# Patient Record
Sex: MALE | Race: NATIVE HAWAIIAN OR OTHER PACIFIC ISLANDER | HISPANIC OR LATINO | URBAN - METROPOLITAN AREA
[De-identification: names, ages, dates, MRNs, and addresses within clinical notes are randomized per-mention and may not be internally consistent; named-entity substitution may affect disease eponyms.]

---

## 2022-06-03 ENCOUNTER — INPATIENT (INPATIENT)
Facility: HOSPITAL | Age: 57
LOS: 1 days | Discharge: HOME | DRG: 310 | End: 2022-06-05
Attending: FAMILY MEDICINE | Admitting: FAMILY MEDICINE
Payer: COMMERCIAL

## 2022-06-03 VITALS — HEIGHT: 69 IN | WEIGHT: 259.04 LBS

## 2022-06-03 DIAGNOSIS — I48.91 UNSPECIFIED ATRIAL FIBRILLATION: ICD-10-CM

## 2022-06-03 DIAGNOSIS — R09.89 OTHER SPECIFIED SYMPTOMS AND SIGNS INVOLVING THE CIRCULATORY AND RESPIRATORY SYSTEMS: ICD-10-CM

## 2022-06-03 LAB
ADD ON TEST-SPECIMEN IN LAB: SIGNIFICANT CHANGE UP
ALBUMIN SERPL ELPH-MCNC: 4.4 G/DL — SIGNIFICANT CHANGE UP (ref 3.3–5)
ALP SERPL-CCNC: 68 U/L — SIGNIFICANT CHANGE UP (ref 40–120)
ALT FLD-CCNC: 84 U/L — HIGH (ref 12–78)
ANION GAP SERPL CALC-SCNC: 12 MMOL/L — SIGNIFICANT CHANGE UP (ref 5–17)
APTT BLD: 30 SEC — SIGNIFICANT CHANGE UP (ref 27.5–35.5)
AST SERPL-CCNC: 40 U/L — HIGH (ref 15–37)
BASOPHILS # BLD AUTO: 0.05 K/UL — SIGNIFICANT CHANGE UP (ref 0–0.2)
BASOPHILS NFR BLD AUTO: 0.4 % — SIGNIFICANT CHANGE UP (ref 0–2)
BILIRUB SERPL-MCNC: 1 MG/DL — SIGNIFICANT CHANGE UP (ref 0.2–1.2)
BUN SERPL-MCNC: 18 MG/DL — SIGNIFICANT CHANGE UP (ref 7–23)
CALCIUM SERPL-MCNC: 9.1 MG/DL — SIGNIFICANT CHANGE UP (ref 8.5–10.1)
CHLORIDE SERPL-SCNC: 95 MMOL/L — LOW (ref 96–108)
CO2 SERPL-SCNC: 24 MMOL/L — SIGNIFICANT CHANGE UP (ref 22–31)
CREAT SERPL-MCNC: 1.17 MG/DL — SIGNIFICANT CHANGE UP (ref 0.5–1.3)
EGFR: 74 ML/MIN/1.73M2 — SIGNIFICANT CHANGE UP
EOSINOPHIL # BLD AUTO: 0.06 K/UL — SIGNIFICANT CHANGE UP (ref 0–0.5)
EOSINOPHIL NFR BLD AUTO: 0.5 % — SIGNIFICANT CHANGE UP (ref 0–6)
GLUCOSE SERPL-MCNC: 269 MG/DL — HIGH (ref 70–99)
HCT VFR BLD CALC: 49.7 % — SIGNIFICANT CHANGE UP (ref 39–50)
HGB BLD-MCNC: 17.2 G/DL — HIGH (ref 13–17)
IMM GRANULOCYTES NFR BLD AUTO: 0.4 % — SIGNIFICANT CHANGE UP (ref 0–1.5)
INR BLD: 1.09 RATIO — SIGNIFICANT CHANGE UP (ref 0.88–1.16)
LYMPHOCYTES # BLD AUTO: 3.93 K/UL — HIGH (ref 1–3.3)
LYMPHOCYTES # BLD AUTO: 30.1 % — SIGNIFICANT CHANGE UP (ref 13–44)
MCHC RBC-ENTMCNC: 27.8 PG — SIGNIFICANT CHANGE UP (ref 27–34)
MCHC RBC-ENTMCNC: 34.6 GM/DL — SIGNIFICANT CHANGE UP (ref 32–36)
MCV RBC AUTO: 80.4 FL — SIGNIFICANT CHANGE UP (ref 80–100)
MONOCYTES # BLD AUTO: 0.87 K/UL — SIGNIFICANT CHANGE UP (ref 0–0.9)
MONOCYTES NFR BLD AUTO: 6.7 % — SIGNIFICANT CHANGE UP (ref 2–14)
NEUTROPHILS # BLD AUTO: 8.08 K/UL — HIGH (ref 1.8–7.4)
NEUTROPHILS NFR BLD AUTO: 61.9 % — SIGNIFICANT CHANGE UP (ref 43–77)
NT-PROBNP SERPL-SCNC: 251 PG/ML — HIGH (ref 0–125)
PLATELET # BLD AUTO: 232 K/UL — SIGNIFICANT CHANGE UP (ref 150–400)
POTASSIUM SERPL-MCNC: 3.3 MMOL/L — LOW (ref 3.5–5.3)
POTASSIUM SERPL-SCNC: 3.3 MMOL/L — LOW (ref 3.5–5.3)
PROT SERPL-MCNC: 8.3 GM/DL — SIGNIFICANT CHANGE UP (ref 6–8.3)
PROTHROM AB SERPL-ACNC: 12.6 SEC — SIGNIFICANT CHANGE UP (ref 10.5–13.4)
RBC # BLD: 6.18 M/UL — HIGH (ref 4.2–5.8)
RBC # FLD: 12.7 % — SIGNIFICANT CHANGE UP (ref 10.3–14.5)
SARS-COV-2 RNA SPEC QL NAA+PROBE: SIGNIFICANT CHANGE UP
SODIUM SERPL-SCNC: 131 MMOL/L — LOW (ref 135–145)
TROPONIN I, HIGH SENSITIVITY RESULT: 9.13 NG/L — SIGNIFICANT CHANGE UP
WBC # BLD: 13.04 K/UL — HIGH (ref 3.8–10.5)
WBC # FLD AUTO: 13.04 K/UL — HIGH (ref 3.8–10.5)

## 2022-06-03 PROCEDURE — 93005 ELECTROCARDIOGRAM TRACING: CPT

## 2022-06-03 PROCEDURE — 83735 ASSAY OF MAGNESIUM: CPT

## 2022-06-03 PROCEDURE — 82962 GLUCOSE BLOOD TEST: CPT

## 2022-06-03 PROCEDURE — 84443 ASSAY THYROID STIM HORMONE: CPT

## 2022-06-03 PROCEDURE — 99285 EMERGENCY DEPT VISIT HI MDM: CPT

## 2022-06-03 PROCEDURE — 71045 X-RAY EXAM CHEST 1 VIEW: CPT | Mod: 26

## 2022-06-03 PROCEDURE — 80076 HEPATIC FUNCTION PANEL: CPT

## 2022-06-03 PROCEDURE — 83036 HEMOGLOBIN GLYCOSYLATED A1C: CPT

## 2022-06-03 PROCEDURE — 85027 COMPLETE CBC AUTOMATED: CPT

## 2022-06-03 PROCEDURE — 93306 TTE W/DOPPLER COMPLETE: CPT

## 2022-06-03 PROCEDURE — 84100 ASSAY OF PHOSPHORUS: CPT

## 2022-06-03 PROCEDURE — 84484 ASSAY OF TROPONIN QUANT: CPT

## 2022-06-03 PROCEDURE — 93010 ELECTROCARDIOGRAM REPORT: CPT

## 2022-06-03 PROCEDURE — 36415 COLL VENOUS BLD VENIPUNCTURE: CPT

## 2022-06-03 PROCEDURE — 99223 1ST HOSP IP/OBS HIGH 75: CPT

## 2022-06-03 PROCEDURE — 80048 BASIC METABOLIC PNL TOTAL CA: CPT

## 2022-06-03 PROCEDURE — 80061 LIPID PANEL: CPT

## 2022-06-03 PROCEDURE — G0378: CPT

## 2022-06-03 RX ORDER — LISINOPRIL 2.5 MG/1
5 TABLET ORAL DAILY
Refills: 0 | Status: DISCONTINUED | OUTPATIENT
Start: 2022-06-03 | End: 2022-06-05

## 2022-06-03 RX ORDER — INSULIN LISPRO 100/ML
VIAL (ML) SUBCUTANEOUS
Refills: 0 | Status: DISCONTINUED | OUTPATIENT
Start: 2022-06-03 | End: 2022-06-05

## 2022-06-03 RX ORDER — DILTIAZEM HCL 120 MG
20 CAPSULE, EXT RELEASE 24 HR ORAL ONCE
Refills: 0 | Status: DISCONTINUED | OUTPATIENT
Start: 2022-06-03 | End: 2022-06-03

## 2022-06-03 RX ORDER — POTASSIUM CHLORIDE 20 MEQ
40 PACKET (EA) ORAL ONCE
Refills: 0 | Status: COMPLETED | OUTPATIENT
Start: 2022-06-03 | End: 2022-06-03

## 2022-06-03 RX ORDER — DEXTROSE 50 % IN WATER 50 %
25 SYRINGE (ML) INTRAVENOUS ONCE
Refills: 0 | Status: DISCONTINUED | OUTPATIENT
Start: 2022-06-03 | End: 2022-06-05

## 2022-06-03 RX ORDER — ACETAMINOPHEN 500 MG
650 TABLET ORAL EVERY 6 HOURS
Refills: 0 | Status: DISCONTINUED | OUTPATIENT
Start: 2022-06-03 | End: 2022-06-05

## 2022-06-03 RX ORDER — GLUCAGON INJECTION, SOLUTION 0.5 MG/.1ML
1 INJECTION, SOLUTION SUBCUTANEOUS ONCE
Refills: 0 | Status: DISCONTINUED | OUTPATIENT
Start: 2022-06-03 | End: 2022-06-05

## 2022-06-03 RX ORDER — ENOXAPARIN SODIUM 100 MG/ML
120 INJECTION SUBCUTANEOUS ONCE
Refills: 0 | Status: COMPLETED | OUTPATIENT
Start: 2022-06-03 | End: 2022-06-04

## 2022-06-03 RX ORDER — ALPRAZOLAM 0.25 MG
0.25 TABLET ORAL AT BEDTIME
Refills: 0 | Status: DISCONTINUED | OUTPATIENT
Start: 2022-06-03 | End: 2022-06-05

## 2022-06-03 RX ORDER — INSULIN LISPRO 100/ML
VIAL (ML) SUBCUTANEOUS AT BEDTIME
Refills: 0 | Status: DISCONTINUED | OUTPATIENT
Start: 2022-06-03 | End: 2022-06-05

## 2022-06-03 RX ORDER — SODIUM CHLORIDE 9 MG/ML
1000 INJECTION, SOLUTION INTRAVENOUS
Refills: 0 | Status: DISCONTINUED | OUTPATIENT
Start: 2022-06-03 | End: 2022-06-05

## 2022-06-03 RX ORDER — ASPIRIN/CALCIUM CARB/MAGNESIUM 324 MG
81 TABLET ORAL DAILY
Refills: 0 | Status: DISCONTINUED | OUTPATIENT
Start: 2022-06-03 | End: 2022-06-05

## 2022-06-03 RX ORDER — DEXTROSE 50 % IN WATER 50 %
15 SYRINGE (ML) INTRAVENOUS ONCE
Refills: 0 | Status: DISCONTINUED | OUTPATIENT
Start: 2022-06-03 | End: 2022-06-05

## 2022-06-03 RX ORDER — DILTIAZEM HCL 120 MG
30 CAPSULE, EXT RELEASE 24 HR ORAL EVERY 6 HOURS
Refills: 0 | Status: DISCONTINUED | OUTPATIENT
Start: 2022-06-03 | End: 2022-06-05

## 2022-06-03 RX ORDER — DEXTROSE 50 % IN WATER 50 %
12.5 SYRINGE (ML) INTRAVENOUS ONCE
Refills: 0 | Status: DISCONTINUED | OUTPATIENT
Start: 2022-06-03 | End: 2022-06-05

## 2022-06-03 RX ORDER — DILTIAZEM HCL 120 MG
20 CAPSULE, EXT RELEASE 24 HR ORAL ONCE
Refills: 0 | Status: COMPLETED | OUTPATIENT
Start: 2022-06-03 | End: 2022-06-03

## 2022-06-03 RX ORDER — DILTIAZEM HCL 120 MG
60 CAPSULE, EXT RELEASE 24 HR ORAL ONCE
Refills: 0 | Status: COMPLETED | OUTPATIENT
Start: 2022-06-03 | End: 2022-06-03

## 2022-06-03 RX ADMIN — Medication 2: at 22:32

## 2022-06-03 RX ADMIN — Medication 60 MILLIGRAM(S): at 19:37

## 2022-06-03 RX ADMIN — Medication 0.25 MILLIGRAM(S): at 23:49

## 2022-06-03 RX ADMIN — Medication 40 MILLIEQUIVALENT(S): at 20:13

## 2022-06-03 RX ADMIN — LISINOPRIL 5 MILLIGRAM(S): 2.5 TABLET ORAL at 23:49

## 2022-06-03 RX ADMIN — Medication 20 MILLIGRAM(S): at 18:48

## 2022-06-03 NOTE — ED PROVIDER NOTE - OBJECTIVE STATEMENT
57 y/o male with a PMHx of HTN on lisinopril presents to the ED c/o SOB. Pt states he has been travelling recently after closing a house in [place]. Reports SOB is worsened by bending over and is having episodes of feeling cold, clammy, diaphoretic, and nausea. Friend at bedside states the pt has been breaking out into a cold sweat x3 days. Pt states he went to urgent care and was prompted to the ED for hypertension. Pt endorses having a cocktail last night. Pt is currently Cardio: Dr. Antonio.

## 2022-06-03 NOTE — ED PROVIDER NOTE - CLINICAL SUMMARY MEDICAL DECISION MAKING FREE TEXT BOX
pt w/ lightheadedness found to be in afib w/ RVR ( new onset ), will rate control, cards consult, admit.

## 2022-06-03 NOTE — H&P ADULT - HISTORY OF PRESENT ILLNESS
56 year old male w hx DM, HTN on lisinopril came to ED c/o SOB     56 year old male w hx DM, HTN on lisinopril came to ED c/o SOB    Pt states recently returned to [place] from [place] 2 days ago afer selling home  Had not felt well ther  +SOB especially when bending over  +diaphoretic + nausea  denied chest pain   reported that pt had been diaphoretic x 3 days intermittently    Had been on metformin but was taken off 1 year ago  Last stress test 1 yr ago normal      In ED /97     RR 20    T 98.7    98%   AFIB w RVR  190  diltiazem 20 mg x 2  diltiazem 60 mg po  converted from afib to sinus tach   telemetry now AF  Kcl 40 meq po    PAST MEDICAL HX  DM II diabetes mellitus  HTN hypertension    PAST SURGICAL HX  Colonoscopy  w polyp : benign      FAMILY HX  +Heart disease : Father  MI age 78, Mother  + DM : Mother, Maternal Grandmother, Paternal Grandmother      SOCIAL HX    nonsmoker  social alcohol on weekend      COVID IMMUNIZATION  J&J x 1, Mod x 1, Pfizer x1 56 year old male w hx DM, HTN on lisinopril came to ED c/o SOB    Pt states recently returned to [place] from [place] 2 days ago afer selling home  Had not felt well there  +SOB especially when bending over  +diaphoretic + nausea  denied chest pain   reported that pt had been diaphoretic x 3 days intermittently    Significant stresses recently    Had been on metformin but was taken off 1 year ago  Last stress test 1 yr ago normal      In ED /97     RR 20    T 98.7    98%   AFIB w RVR  190  diltiazem 20 mg x 2  diltiazem 60 mg po  converted from afib to sinus tach   telemetry now AF  KCl 40 meq po    PAST MEDICAL HX  DM II diabetes mellitus  HTN hypertension    PAST SURGICAL HX  Colonoscopy  w polyp : benign      FAMILY HX  +Heart disease : Father  MI age 78, Mother  + DM : Mother, Maternal Grandmother, Paternal Grandmother      SOCIAL HX    nonsmoker  social alcohol on weekend      COVID IMMUNIZATION  J&J x 1, Mod x 1, Pfizer x1

## 2022-06-03 NOTE — H&P ADULT - ASSESSMENT
#New AFIB w RVR  CHADS - VASC 2  1. admit to telemetry  2. serial trop  3. diltiazem 40 mg IV and 60 mg po in ED  4. diltiazem 30 mg q 6 hrs  5. ECHO  6. Cardiology  7. TSH      #Hypokalemia  1. KCl 40 meq in ED  2. BMP in AM  3. Mg      #DM II w hyperglycemia  1. diet  2. BGM  3. ISS  4. Hb A1c  5. will likely need to restart metformin at time of discharge             #New AFIB w RVR  CHA2DS 2- VASC  2  DM + HTN (+obesity)  1. admit to telemetry  2. serial trop  3. diltiazem 40 mg IV and 60 mg po in ED  4. diltiazem 30 mg q 6 hrs  5. ECHO  6. Cardiology  7. TSH  8. will treat w lovenox 120 mg x 1 dose  9. after echo can decide OAC      #Hypokalemia  1. KCl 40 meq in ED  2. BMP in AM  3. Mg      #DM II w hyperglycemia  1. diet  2. BGM  3. ISS  4. Hb A1c  5. will likely need to restart metformin at time of discharge      #Elevated LFT  1. trend      #Apnea at night observed by    1. O2 overnight  2. to discuss sleep study w PCP      ##HTN  1. will decrease dose of lisinopril while on diltiazem  2. diltiazem as above        #VTE  lovenox x 1 dose  to decide on AC after ECHO

## 2022-06-03 NOTE — H&P ADULT - NSHPLABSRESULTS_GEN_ALL_CORE
EKG AFIB 167 wavering baseline   No dynamic ST-T changes (my reading)      CXR nl heart size no PVC by my reading

## 2022-06-03 NOTE — ED ADULT NURSE REASSESSMENT NOTE - NS ED NURSE REASSESS COMMENT FT1
received report from CARLOS Nieves. pt. resting comfortably with partner at bedside. pt converted from afib to sinus tach on the cardiac monitor. repeat EKG in progress.

## 2022-06-03 NOTE — ED PROVIDER NOTE - PHYSICAL EXAMINATION
No GEN - NAD; well appearing; A+O x3   HEAD - NC/AT     EYES - EOMI, no conjunctival pallor, no scleral icterus  ENT -   mucous membranes  moist , no discharge      NECK - Neck supple  PULM - CTA b/l,  symmetric breath sounds  COR -  irregular, rapid   ABD - , ND, NT, soft, no guarding, no rebound, no masses    BACK - no CVA tenderness, nontender spine     EXTREMS - no edema, no deformity, warm and well perfused    SKIN - diaphoretic   NEUROLOGIC - alert, sensation nl, motor 5/5 RUE/LUE/RLE/LLE

## 2022-06-03 NOTE — ED ADULT NURSE NOTE - OBJECTIVE STATEMENT
Pt presents to ED from home for rapid HR. Pt reports feeling SOB for a few days and just felt uncomfortable. No cardiac hx. No nausea/vo,iting. Denies chest, shoulder,back pain

## 2022-06-03 NOTE — H&P ADULT - NSHPPHYSICALEXAM_GEN_ALL_CORE
Vital Signs Last 24 Hrs  T(C): 37.2 (03 Jun 2022 19:26), Max: 37.2 (03 Jun 2022 19:26)  T(F): 98.9 (03 Jun 2022 19:26), Max: 98.9 (03 Jun 2022 19:26)  HR: 116 (03 Jun 2022 21:09) (110 - 116)  BP: 135/96 (03 Jun 2022 21:09) (127/81 - 152/97)  BP(mean): 109 (03 Jun 2022 21:09) (95 - 111)  RR: 16 (03 Jun 2022 21:09) (16 - 20)  SpO2: 96% (03 Jun 2022 21:09) (96% - 99%)

## 2022-06-04 LAB
A1C WITH ESTIMATED AVERAGE GLUCOSE RESULT: 10.3 % — HIGH (ref 4–5.6)
ALBUMIN SERPL ELPH-MCNC: 3.9 G/DL — SIGNIFICANT CHANGE UP (ref 3.3–5)
ALP SERPL-CCNC: 59 U/L — SIGNIFICANT CHANGE UP (ref 40–120)
ALT FLD-CCNC: 70 U/L — SIGNIFICANT CHANGE UP (ref 12–78)
ANION GAP SERPL CALC-SCNC: 8 MMOL/L — SIGNIFICANT CHANGE UP (ref 5–17)
AST SERPL-CCNC: 34 U/L — SIGNIFICANT CHANGE UP (ref 15–37)
BILIRUB DIRECT SERPL-MCNC: 0.4 MG/DL — HIGH (ref 0–0.3)
BILIRUB INDIRECT FLD-MCNC: 1 MG/DL — SIGNIFICANT CHANGE UP (ref 0.2–1)
BILIRUB SERPL-MCNC: 1.4 MG/DL — HIGH (ref 0.2–1.2)
BUN SERPL-MCNC: 16 MG/DL — SIGNIFICANT CHANGE UP (ref 7–23)
CALCIUM SERPL-MCNC: 9.1 MG/DL — SIGNIFICANT CHANGE UP (ref 8.5–10.1)
CHLORIDE SERPL-SCNC: 96 MMOL/L — SIGNIFICANT CHANGE UP (ref 96–108)
CHOLEST SERPL-MCNC: 293 MG/DL — HIGH
CO2 SERPL-SCNC: 27 MMOL/L — SIGNIFICANT CHANGE UP (ref 22–31)
CREAT SERPL-MCNC: 0.93 MG/DL — SIGNIFICANT CHANGE UP (ref 0.5–1.3)
EGFR: 97 ML/MIN/1.73M2 — SIGNIFICANT CHANGE UP
ESTIMATED AVERAGE GLUCOSE: 249 MG/DL — HIGH (ref 68–114)
GLUCOSE SERPL-MCNC: 243 MG/DL — HIGH (ref 70–99)
HDLC SERPL-MCNC: 38 MG/DL — LOW
LIPID PNL WITH DIRECT LDL SERPL: 216 MG/DL — HIGH
MAGNESIUM SERPL-MCNC: 2.1 MG/DL — SIGNIFICANT CHANGE UP (ref 1.6–2.6)
NON HDL CHOLESTEROL: 255 MG/DL — HIGH
POTASSIUM SERPL-MCNC: 4.2 MMOL/L — SIGNIFICANT CHANGE UP (ref 3.5–5.3)
POTASSIUM SERPL-SCNC: 4.2 MMOL/L — SIGNIFICANT CHANGE UP (ref 3.5–5.3)
PROT SERPL-MCNC: 7.6 GM/DL — SIGNIFICANT CHANGE UP (ref 6–8.3)
SODIUM SERPL-SCNC: 131 MMOL/L — LOW (ref 135–145)
TRIGL SERPL-MCNC: 194 MG/DL — HIGH
TROPONIN I, HIGH SENSITIVITY RESULT: 8.54 NG/L — SIGNIFICANT CHANGE UP
TSH SERPL-MCNC: 0.64 UU/ML — SIGNIFICANT CHANGE UP (ref 0.34–4.82)

## 2022-06-04 PROCEDURE — 99232 SBSQ HOSP IP/OBS MODERATE 35: CPT

## 2022-06-04 PROCEDURE — 93306 TTE W/DOPPLER COMPLETE: CPT | Mod: 26

## 2022-06-04 PROCEDURE — 93010 ELECTROCARDIOGRAM REPORT: CPT

## 2022-06-04 RX ORDER — INSULIN GLARGINE 100 [IU]/ML
10 INJECTION, SOLUTION SUBCUTANEOUS AT BEDTIME
Refills: 0 | Status: DISCONTINUED | OUTPATIENT
Start: 2022-06-04 | End: 2022-06-05

## 2022-06-04 RX ORDER — APIXABAN 2.5 MG/1
5 TABLET, FILM COATED ORAL EVERY 12 HOURS
Refills: 0 | Status: DISCONTINUED | OUTPATIENT
Start: 2022-06-04 | End: 2022-06-05

## 2022-06-04 RX ORDER — METOPROLOL TARTRATE 50 MG
50 TABLET ORAL DAILY
Refills: 0 | Status: DISCONTINUED | OUTPATIENT
Start: 2022-06-04 | End: 2022-06-05

## 2022-06-04 RX ADMIN — Medication 50 MILLIGRAM(S): at 12:18

## 2022-06-04 RX ADMIN — Medication 30 MILLIGRAM(S): at 12:17

## 2022-06-04 RX ADMIN — Medication 4: at 16:43

## 2022-06-04 RX ADMIN — APIXABAN 5 MILLIGRAM(S): 2.5 TABLET, FILM COATED ORAL at 21:00

## 2022-06-04 RX ADMIN — Medication 6: at 12:18

## 2022-06-04 RX ADMIN — Medication 30 MILLIGRAM(S): at 05:40

## 2022-06-04 RX ADMIN — Medication 4: at 08:33

## 2022-06-04 RX ADMIN — Medication 30 MILLIGRAM(S): at 23:06

## 2022-06-04 RX ADMIN — Medication 30 MILLIGRAM(S): at 18:08

## 2022-06-04 RX ADMIN — Medication 30 MILLIGRAM(S): at 00:52

## 2022-06-04 RX ADMIN — LISINOPRIL 5 MILLIGRAM(S): 2.5 TABLET ORAL at 09:23

## 2022-06-04 RX ADMIN — Medication 0.25 MILLIGRAM(S): at 20:59

## 2022-06-04 RX ADMIN — INSULIN GLARGINE 10 UNIT(S): 100 INJECTION, SOLUTION SUBCUTANEOUS at 21:00

## 2022-06-04 RX ADMIN — Medication 81 MILLIGRAM(S): at 09:23

## 2022-06-04 RX ADMIN — APIXABAN 5 MILLIGRAM(S): 2.5 TABLET, FILM COATED ORAL at 12:18

## 2022-06-04 RX ADMIN — ENOXAPARIN SODIUM 120 MILLIGRAM(S): 100 INJECTION SUBCUTANEOUS at 00:45

## 2022-06-04 NOTE — PROGRESS NOTE ADULT - ASSESSMENT
#New AFIB w RVR  CHA2DS 2- VASC  2  DM + HTN (+obesity)  -continue  telemetry  -S/P Cardizem; now on Metoprolol  -FU ECHO  -Cardiology consult appreciated   -TSH WNL    #DM II w hyperglycemia - uncontrolled   -diet  -BGM  -ISS  -Hb A1c  -will likely need to restart metformin at time of discharge  -add Lantus 10 for now     #Apnea at night observed by    -O2 overnight  - to discuss sleep study w PCP      #HTN  - decrease dose of lisinopril while on diltiazem  - continue Metoprolol    #VTE  Eliquis

## 2022-06-04 NOTE — PATIENT PROFILE ADULT - FALL HARM RISK - UNIVERSAL INTERVENTIONS
Bed in lowest position, wheels locked, appropriate side rails in place/Call bell, personal items and telephone in reach/Instruct patient to call for assistance before getting out of bed or chair/Non-slip footwear when patient is out of bed/[place] to call system/Physically safe environment - no spills, clutter or unnecessary equipment/Purposeful Proactive Rounding/Room/bathroom lighting operational, light cord in reach

## 2022-06-04 NOTE — CONSULT NOTE ADULT - ASSESSMENT
56 year old woman with the above history who is admitted with new onset rapid atrial fibrillation.  She is a CHADSVASC 2.  I will start oral AC with Eliquis.  She may require NICOLE/cardioversion.  No evidence for ACS. 56 year old man with the above history who is admitted with new onset rapid atrial fibrillation.   I will start oral AC with Eliquis.  She may require NICOLE/cardioversion.  No evidence for ACS. 56 year old man with the above history who is admitted with new onset rapid atrial fibrillation.   I will start oral AC with Eliquis. I will also start b blocker therapy.  He may require NICOLE/cardioversion.  No evidence for ACS.

## 2022-06-04 NOTE — CONSULT NOTE ADULT - SUBJECTIVE AND OBJECTIVE BOX
CHIEF COMPLAINT: Patient is a 56y old  Male who presents with a chief complaint of new AFIB w RVR (2022 20:55)      HPI: HPI:  56 year old male w hx DM, HTN on lisinopril came to ED c/o SOB    Pt states recently returned to [place] from [place] 2 days ago afer selling home  Had not felt well there  +SOB especially when bending over  +diaphoretic + nausea  denied chest pain   reported that pt had been diaphoretic x 3 days intermittently    Significant stresses recently    Had been on metformin but was taken off 1 year ago  Last stress test 1 yr ago normal      In ED /97     RR 20    T 98.7    98%   AFIB w RVR  190  diltiazem 20 mg x 2  diltiazem 60 mg po  converted from afib to sinus tach   telemetry now AF  KCl 40 meq po    PAST MEDICAL HX  DM II diabetes mellitus  HTN hypertension    PAST SURGICAL HX  Colonoscopy  w polyp : benign      FAMILY HX  +Heart disease : Father  MI age 78, Mother  + DM : Mother, Maternal Grandmother, Paternal Grandmother      SOCIAL HX    nonsmoker  social alcohol on weekend      COVID IMMUNIZATION  J&J x 1, Mod x 1, Pfizer x1 (2022 20:55)      PMHx: PAST MEDICAL & SURGICAL HISTORY:  HTN (hypertension)            Soc Hx:     FAMILY HISTORY:      Allergies: Allergies    No Known Allergies    Intolerances    melatonin (Other)        REVIEW OF SYSTEMS:    As above  No chest pain + shortness of breath  No lightheadeness or syncope  No leg swelling  No palpitations  No claudication-like symptoms    Vital Signs Last 24 Hrs  T(C): 37.1 (2022 08:51), Max: 37.2 (2022 19:26)  T(F): 98.8 (2022 08:51), Max: 98.9 (2022 19:26)  HR: 100 (2022 08:51) (97 - 116)  BP: 141/88 (2022 08:51) (123/90 - 152/97)  BP(mean): 101 (2022 23:51) (95 - 111)  RR: 18 (2022 08:51) (14 - 20)  SpO2: 96% (2022 08:51) (96% - 99%)    I&O's Summary      CAPILLARY BLOOD GLUCOSE      POCT Blood Glucose.: 248 mg/dL (2022 08:06)  POCT Blood Glucose.: 223 mg/dL (2022 00:45)  POCT Blood Glucose.: 258 mg/dL (2022 22:22)  POCT Blood Glucose.: 277 mg/dL (2022 18:44)      PHYSICAL EXAM:   Patient in NAD  Neck: No JVD; Carotids:  2+ without bruits  Respiratory:  Clear to A&P  Cardiovascular: S1 and S2, irregular rate and rhythm, no Murmurs, gallops or rubs  Gastrointestinal:  Soft, non-tender; BS positive  Extremities: No peripheral edema  Vascular: 2+ peripheral pulses  Neurological: A/O x 3, no focal deficits      MEDICATIONS:  MEDICATIONS  (STANDING):  ALPRAZolam 0.25 milliGRAM(s) Oral at bedtime  aspirin enteric coated 81 milliGRAM(s) Oral daily  dextrose 5%. 1000 milliLiter(s) (100 mL/Hr) IV Continuous <Continuous>  dextrose 5%. 1000 milliLiter(s) (50 mL/Hr) IV Continuous <Continuous>  dextrose 50% Injectable 25 Gram(s) IV Push once  dextrose 50% Injectable 12.5 Gram(s) IV Push once  dextrose 50% Injectable 25 Gram(s) IV Push once  diltiazem    Tablet 30 milliGRAM(s) Oral every 6 hours  glucagon  Injectable 1 milliGRAM(s) IntraMuscular once  insulin lispro (ADMELOG) corrective regimen sliding scale   SubCutaneous three times a day before meals  insulin lispro (ADMELOG) corrective regimen sliding scale   SubCutaneous at bedtime  lisinopril 5 milliGRAM(s) Oral daily      LABS: All Labs Reviewed:  Blood Culture:   BNP Serum Pro-Brain Natriuretic Peptide: 251 pg/mL ( @ 18:42)    CBC             WBC Count: 13.04 K/uL ( @ 18:42)              Hemoglobin: 17.2 g/dL ( @ 18:42)              Hematocrit: 49.7 % ( @ 18:42)              Mean Cell Volume: 80.4 fl ( @ 18:42)              Platelet Count - Automated: 232 K/uL ( @ 18:42)                            Cardiac markers   Troponin I, High Sensitivity (22 @ 18:42) Troponin I, High Sensitivity Result: 9.13  Troponin I, High Sensitivity (06.04.22 @ 07:21) Troponin I, High Sensitivity Result: 8.54                                     Chems        Sodium, Serum: 131 mmol/L ( 07:21)  Sodium, Serum: 131 mmol/L ( 18:42)          Potassium, Serum: 4.2 mmol/L (:21)  Potassium, Serum: 3.3 mmol/L ( 18:42)          Blood Urea Nitrogen, Serum: 16 mg/dL (:21)  Blood Urea Nitrogen, Serum: 18 mg/dL ( 18:42)          Creatinine 0.93  Creatinine 1.17          Magnesium, Serum: 2.1 mg/dL (:21)          Protein Total, Serum: 7.6 gm/dL (:21)  Protein Total, Serum: 8.3 gm/dL ( 18:42)                  Calcium, Total Serum: 9.1 mg/dL (:21)  Calcium, Total Serum: 9.1 mg/dL ( 18:42)                  Bilirubin Total, Serum: 1.4 mg/dL (:21)  Bilirubin Total, Serum: 1.0 mg/dL ( 18:42)          Alanine Aminotransferase (ALT/SGPT): 70 U/L ( @ :21)  Alanine Aminotransferase (ALT/SGPT): 84 U/L ( @ 18:42)          Aspartate Aminotransferase (AST/SGOT): 34 U/L (:21)  Aspartate Aminotransferase (AST/SGOT): 40 U/L ( 18:42)                 INR: 1.09 ratio ( 18:42)             RADIOLOGY:    EKG:    Telemetry:    ECHO:       CHIEF COMPLAINT: Patient is a 56y old  Male who presents with a chief complaint of new AFIB w RVR (2022 20:55)      HPI: HPI:  56 year old male w hx DM, HTN on lisinopril came to ED c/o SOB    Pt states recently returned to [place] from [place] 2 days ago afer selling home  Had not felt well there  +SOB especially when bending over  +diaphoretic + nausea  denied chest pain   reported that pt had been diaphoretic x 3 days intermittently    Significant stresses recently    Had been on metformin but was taken off 1 year ago  Last stress test 1 yr ago normal      In ED /97     RR 20    T 98.7    98%   AFIB w RVR  190  diltiazem 20 mg x 2  diltiazem 60 mg po  converted from afib to sinus tach   telemetry now AF  KCl 40 meq po    PAST MEDICAL HX  DM II diabetes mellitus  HTN hypertension    PAST SURGICAL HX  Colonoscopy  w polyp : benign      FAMILY HX  +Heart disease : Father  MI age 78, Mother  + DM : Mother, Maternal Grandmother, Paternal Grandmother      SOCIAL HX    nonsmoker  social alcohol on weekend      COVID IMMUNIZATION  J&J x 1, Mod x 1, Pfizer x1 (2022 20:55)      PMHx: PAST MEDICAL & SURGICAL HISTORY:  HTN (hypertension)            Soc Hx:     FAMILY HISTORY:      Allergies: Allergies    No Known Allergies    Intolerances    melatonin (Other)        REVIEW OF SYSTEMS:    As above  No chest pain + shortness of breath  No lightheadeness or syncope  No leg swelling  No palpitations  No claudication-like symptoms    Vital Signs Last 24 Hrs  T(C): 37.1 (2022 08:51), Max: 37.2 (2022 19:26)  T(F): 98.8 (2022 08:51), Max: 98.9 (2022 19:26)  HR: 100 (2022 08:51) (97 - 116)  BP: 141/88 (2022 08:51) (123/90 - 152/97)  BP(mean): 101 (2022 23:51) (95 - 111)  RR: 18 (2022 08:51) (14 - 20)  SpO2: 96% (2022 08:51) (96% - 99%)    I&O's Summary      CAPILLARY BLOOD GLUCOSE      POCT Blood Glucose.: 248 mg/dL (2022 08:06)  POCT Blood Glucose.: 223 mg/dL (2022 00:45)  POCT Blood Glucose.: 258 mg/dL (2022 22:22)  POCT Blood Glucose.: 277 mg/dL (2022 18:44)      PHYSICAL EXAM:   Patient in NAD  Neck: No JVD; Carotids:  2+ without bruits  Respiratory:  Clear to A&P  Cardiovascular: S1 and S2, irregular rate and rhythm, no Murmurs, gallops or rubs  Gastrointestinal:  Soft, non-tender; BS positive  Extremities: No peripheral edema  Vascular: 2+ peripheral pulses  Neurological: A/O x 3, no focal deficits      MEDICATIONS:  MEDICATIONS  (STANDING):  ALPRAZolam 0.25 milliGRAM(s) Oral at bedtime  aspirin enteric coated 81 milliGRAM(s) Oral daily  dextrose 5%. 1000 milliLiter(s) (100 mL/Hr) IV Continuous <Continuous>  dextrose 5%. 1000 milliLiter(s) (50 mL/Hr) IV Continuous <Continuous>  dextrose 50% Injectable 25 Gram(s) IV Push once  dextrose 50% Injectable 12.5 Gram(s) IV Push once  dextrose 50% Injectable 25 Gram(s) IV Push once  diltiazem    Tablet 30 milliGRAM(s) Oral every 6 hours  glucagon  Injectable 1 milliGRAM(s) IntraMuscular once  insulin lispro (ADMELOG) corrective regimen sliding scale   SubCutaneous three times a day before meals  insulin lispro (ADMELOG) corrective regimen sliding scale   SubCutaneous at bedtime  lisinopril 5 milliGRAM(s) Oral daily      LABS: All Labs Reviewed:  Blood Culture:   BNP Serum Pro-Brain Natriuretic Peptide: 251 pg/mL ( @ 18:42)    CBC             WBC Count: 13.04 K/uL ( @ 18:42)              Hemoglobin: 17.2 g/dL ( @ 18:42)              Hematocrit: 49.7 % ( @ 18:42)              Mean Cell Volume: 80.4 fl ( @ 18:42)              Platelet Count - Automated: 232 K/uL ( @ 18:42)                            Cardiac markers   Troponin I, High Sensitivity (22 @ 18:42) Troponin I, High Sensitivity Result: 9.13  Troponin I, High Sensitivity (06.04.22 @ 07:21) Troponin I, High Sensitivity Result: 8.54                                     Chems        Sodium, Serum: 131 mmol/L ( 07:21)  Sodium, Serum: 131 mmol/L ( 18:42)          Potassium, Serum: 4.2 mmol/L (:21)  Potassium, Serum: 3.3 mmol/L ( 18:42)          Blood Urea Nitrogen, Serum: 16 mg/dL (:21)  Blood Urea Nitrogen, Serum: 18 mg/dL ( 18:42)          Creatinine 0.93  Creatinine 1.17          Magnesium, Serum: 2.1 mg/dL (:21)          Protein Total, Serum: 7.6 gm/dL (:21)  Protein Total, Serum: 8.3 gm/dL ( 18:42)                  Calcium, Total Serum: 9.1 mg/dL (:21)  Calcium, Total Serum: 9.1 mg/dL ( 18:42)                  Bilirubin Total, Serum: 1.4 mg/dL (:21)  Bilirubin Total, Serum: 1.0 mg/dL ( 18:42)          Alanine Aminotransferase (ALT/SGPT): 70 U/L (:21)  Alanine Aminotransferase (ALT/SGPT): 84 U/L ( 18:42)          Aspartate Aminotransferase (AST/SGOT): 34 U/L (:21)  Aspartate Aminotransferase (AST/SGOT): 40 U/L ( 18:42)                 INR: 1.09 ratio ( 18:42)             RADIOLOGY:    EKG:  Atrial fibrillation with a rapid VR    Telemetry:  Atrial fibrillation; HR ~ 105    ECHO:

## 2022-06-04 NOTE — PROGRESS NOTE ADULT - SUBJECTIVE AND OBJECTIVE BOX
HOSPITALIST PROGRESS NOTE:  SUBJECTIVE:  PCP:  Chief Complaint: Patient is a 56y old  Male who presents with a chief complaint of new AFIB w RVR (04 Jun 2022 09:10)      HPI:  56 year old male w hx DM, HTN on lisinopril came to ED c/o SOB    Pt states recently returned to [place] from [place] 2 days ago afer selling home  Had not felt well there  +SOB especially when bending over  +diaphoretic + nausea  denied chest pain   reported that pt had been diaphoretic x 3 days intermittently    Significant stresses recently    Had been on metformin but was taken off 1 year ago  Last stress test 1 yr ago normal      In ED /97     RR 20    T 98.7    98%   AFIB w RVR  190  diltiazem 20 mg x 2  diltiazem 60 mg po  converted from afib to sinus tach   telemetry now AF  KCl 40 meq po    6/4:  Above reviewed. Patient has no complaints. No CP or palpitations; sugars have been uncontrolled; converted to NSR;     Allergies:  melatonin (Other)  No Known Allergies    REVIEW OF SYSTEMS:  See HPI. All other review of systems is negative unless indicated above.     OBJECTIVE  Physical Exam:  Vital Signs:  Height (cm): 175.3 (06-04 @ 00:36)  Weight (kg): 117.8 (06-04 @ 00:36)  BMI (kg/m2): 38.3 (06-04 @ 00:36)  BSA (m2): 2.31 (06-04 @ 00:36)  Vital Signs Last 24 Hrs  T(C): 37.1 (04 Jun 2022 08:51), Max: 37.2 (03 Jun 2022 19:26)  T(F): 98.8 (04 Jun 2022 08:51), Max: 98.9 (03 Jun 2022 19:26)  HR: 100 (04 Jun 2022 08:51) (97 - 116)  BP: 141/88 (04 Jun 2022 08:51) (123/90 - 152/97)  BP(mean): 101 (03 Jun 2022 23:51) (95 - 111)  RR: 18 (04 Jun 2022 08:51) (14 - 20)  SpO2: 96% (04 Jun 2022 08:51) (96% - 99%)  I&O's Summary      Constitutional: NAD, awake and alert  Neurological: AAO x 3, no focal deficits  HEENT: PERRLA, EOMI, MMM  Neck: Soft and supple, No LAD, No JVD  Respiratory: Breath sounds are clear bilaterally, No wheezing, rales or rhonchi  Cardiovascular: S1 and S2, regular rate and rhythm; no Murmurs, gallops or rubs  Gastrointestinal: Bowel Sounds present, soft, nontender, nondistended, no guarding, no rebound tenderness  Back: No CVA tenderness   Extremities: No peripheral edema  Vascular: 2+ peripheral pulses  Musculoskeletal: 5/5 strength b/l upper and lower extremities  Skin: No rashes  Breast: Deferred  Rectal: Deferred    MEDICATIONS  (STANDING):  ALPRAZolam 0.25 milliGRAM(s) Oral at bedtime  apixaban 5 milliGRAM(s) Oral every 12 hours  aspirin enteric coated 81 milliGRAM(s) Oral daily  dextrose 5%. 1000 milliLiter(s) (100 mL/Hr) IV Continuous <Continuous>  dextrose 5%. 1000 milliLiter(s) (50 mL/Hr) IV Continuous <Continuous>  dextrose 50% Injectable 25 Gram(s) IV Push once  dextrose 50% Injectable 12.5 Gram(s) IV Push once  dextrose 50% Injectable 25 Gram(s) IV Push once  diltiazem    Tablet 30 milliGRAM(s) Oral every 6 hours  glucagon  Injectable 1 milliGRAM(s) IntraMuscular once  insulin lispro (ADMELOG) corrective regimen sliding scale   SubCutaneous three times a day before meals  insulin lispro (ADMELOG) corrective regimen sliding scale   SubCutaneous at bedtime  lisinopril 5 milliGRAM(s) Oral daily  metoprolol succinate ER 50 milliGRAM(s) Oral daily      LABS: All Labs Reviewed:                        17.2   13.04 )-----------( 232      ( 03 Jun 2022 18:42 )             49.7     06-04    131<L>  |  96  |  16  ----------------------------<  243<H>  4.2   |  27  |  0.93    Ca    9.1      04 Jun 2022 07:21  Mg     2.1     06-04    TPro  7.6  /  Alb  3.9  /  TBili  1.4<H>  /  DBili  0.4<H>  /  AST  34  /  ALT  70  /  AlkPhos  59  06-04    PT/INR - ( 03 Jun 2022 18:42 )   PT: 12.6 sec;   INR: 1.09 ratio         PTT - ( 03 Jun 2022 18:42 )  PTT:30.0 sec    RADIOLOGY/EKG:    < from: Xray Chest 1 View- PORTABLE-Urgent (06.03.22 @ 19:13) >    IMPRESSION:  No active parenchymal disease in the chest.      < end of copied text >

## 2022-06-05 VITALS
SYSTOLIC BLOOD PRESSURE: 138 MMHG | OXYGEN SATURATION: 96 % | DIASTOLIC BLOOD PRESSURE: 92 MMHG | RESPIRATION RATE: 18 BRPM | HEART RATE: 87 BPM | TEMPERATURE: 97 F

## 2022-06-05 LAB
A1C WITH ESTIMATED AVERAGE GLUCOSE RESULT: 10.1 % — HIGH (ref 4–5.6)
ANION GAP SERPL CALC-SCNC: 8 MMOL/L — SIGNIFICANT CHANGE UP (ref 5–17)
BUN SERPL-MCNC: 16 MG/DL — SIGNIFICANT CHANGE UP (ref 7–23)
CALCIUM SERPL-MCNC: 8.6 MG/DL — SIGNIFICANT CHANGE UP (ref 8.5–10.1)
CHLORIDE SERPL-SCNC: 101 MMOL/L — SIGNIFICANT CHANGE UP (ref 96–108)
CO2 SERPL-SCNC: 26 MMOL/L — SIGNIFICANT CHANGE UP (ref 22–31)
CREAT SERPL-MCNC: 0.88 MG/DL — SIGNIFICANT CHANGE UP (ref 0.5–1.3)
EGFR: 102 ML/MIN/1.73M2 — SIGNIFICANT CHANGE UP
ESTIMATED AVERAGE GLUCOSE: 243 MG/DL — HIGH (ref 68–114)
GLUCOSE SERPL-MCNC: 215 MG/DL — HIGH (ref 70–99)
HCT VFR BLD CALC: 45.3 % — SIGNIFICANT CHANGE UP (ref 39–50)
HGB BLD-MCNC: 14.9 G/DL — SIGNIFICANT CHANGE UP (ref 13–17)
MAGNESIUM SERPL-MCNC: 2.2 MG/DL — SIGNIFICANT CHANGE UP (ref 1.6–2.6)
MCHC RBC-ENTMCNC: 27.4 PG — SIGNIFICANT CHANGE UP (ref 27–34)
MCHC RBC-ENTMCNC: 32.9 GM/DL — SIGNIFICANT CHANGE UP (ref 32–36)
MCV RBC AUTO: 83.3 FL — SIGNIFICANT CHANGE UP (ref 80–100)
PHOSPHATE SERPL-MCNC: 2.9 MG/DL — SIGNIFICANT CHANGE UP (ref 2.5–4.5)
PLATELET # BLD AUTO: 169 K/UL — SIGNIFICANT CHANGE UP (ref 150–400)
POTASSIUM SERPL-MCNC: 3.7 MMOL/L — SIGNIFICANT CHANGE UP (ref 3.5–5.3)
POTASSIUM SERPL-SCNC: 3.7 MMOL/L — SIGNIFICANT CHANGE UP (ref 3.5–5.3)
RBC # BLD: 5.44 M/UL — SIGNIFICANT CHANGE UP (ref 4.2–5.8)
RBC # FLD: 12.8 % — SIGNIFICANT CHANGE UP (ref 10.3–14.5)
SODIUM SERPL-SCNC: 135 MMOL/L — SIGNIFICANT CHANGE UP (ref 135–145)
WBC # BLD: 5.53 K/UL — SIGNIFICANT CHANGE UP (ref 3.8–10.5)
WBC # FLD AUTO: 5.53 K/UL — SIGNIFICANT CHANGE UP (ref 3.8–10.5)

## 2022-06-05 PROCEDURE — 99239 HOSP IP/OBS DSCHRG MGMT >30: CPT

## 2022-06-05 RX ORDER — ASPIRIN/CALCIUM CARB/MAGNESIUM 324 MG
1 TABLET ORAL
Qty: 0 | Refills: 0 | DISCHARGE

## 2022-06-05 RX ORDER — ASPIRIN/CALCIUM CARB/MAGNESIUM 324 MG
1 TABLET ORAL
Qty: 30 | Refills: 0
Start: 2022-06-05 | End: 2022-07-04

## 2022-06-05 RX ORDER — METOPROLOL TARTRATE 50 MG
1 TABLET ORAL
Qty: 30 | Refills: 0
Start: 2022-06-05 | End: 2022-07-04

## 2022-06-05 RX ORDER — LISINOPRIL 2.5 MG/1
0 TABLET ORAL
Qty: 0 | Refills: 0 | DISCHARGE

## 2022-06-05 RX ORDER — METFORMIN HYDROCHLORIDE 850 MG/1
1 TABLET ORAL
Qty: 60 | Refills: 0
Start: 2022-06-05 | End: 2022-07-04

## 2022-06-05 RX ORDER — DILTIAZEM HCL 120 MG
1 CAPSULE, EXT RELEASE 24 HR ORAL
Qty: 30 | Refills: 0
Start: 2022-06-05 | End: 2022-07-04

## 2022-06-05 RX ORDER — ATORVASTATIN CALCIUM 80 MG/1
1 TABLET, FILM COATED ORAL
Qty: 30 | Refills: 0
Start: 2022-06-05 | End: 2022-07-04

## 2022-06-05 RX ORDER — DILTIAZEM HCL 120 MG
120 CAPSULE, EXT RELEASE 24 HR ORAL DAILY
Refills: 0 | Status: DISCONTINUED | OUTPATIENT
Start: 2022-06-05 | End: 2022-06-05

## 2022-06-05 RX ADMIN — Medication 50 MILLIGRAM(S): at 09:18

## 2022-06-05 RX ADMIN — Medication 81 MILLIGRAM(S): at 09:18

## 2022-06-05 RX ADMIN — Medication 120 MILLIGRAM(S): at 09:18

## 2022-06-05 RX ADMIN — LISINOPRIL 5 MILLIGRAM(S): 2.5 TABLET ORAL at 09:18

## 2022-06-05 RX ADMIN — Medication 4: at 12:07

## 2022-06-05 RX ADMIN — Medication 4: at 09:10

## 2022-06-05 RX ADMIN — Medication 30 MILLIGRAM(S): at 05:16

## 2022-06-05 NOTE — PROGRESS NOTE ADULT - SUBJECTIVE AND OBJECTIVE BOX
CHIEF COMPLAINT: Patient is a 56y old  Male who presents with a chief complaint of new AFIB w RVR (2022 20:55)      HPI: HPI:  56 year old male w hx DM, HTN on lisinopril came to ED c/o SOB    Pt states recently returned to [place] from [place] 2 days ago afer selling home  Had not felt well there  +SOB especially when bending over  +diaphoretic + nausea  denied chest pain   reported that pt had been diaphoretic x 3 days intermittently    Significant stresses recently    Had been on metformin but was taken off 1 year ago  Last stress test 1 yr ago normal      In ED /97     RR 20    T 98.7    98%   AFIB w RVR  190  diltiazem 20 mg x 2  diltiazem 60 mg po  converted from afib to sinus tach   telemetry now AF  KCl 40 meq po    PAST MEDICAL HX  DM II diabetes mellitus  HTN hypertension    PAST SURGICAL HX  Colonoscopy  w polyp : benign      FAMILY HX  +Heart disease : Father  MI age 78, Mother  + DM : Mother, Maternal Grandmother, Paternal Grandmother      SOCIAL HX    nonsmoker  social alcohol on weekend      COVID IMMUNIZATION  J&J x 1, Mod x 1, Pfizer x1 (2022 20:55)      PMHx: PAST MEDICAL & SURGICAL HISTORY:  HTN (hypertension)            Soc Hx:     FAMILY HISTORY:      Allergies: Allergies    No Known Allergies    Intolerances    melatonin (Other)    22:  Patient converted to sinus rhythm yesterday        REVIEW OF SYSTEMS:    Asymptomatic    ICU Vital Signs Last 24 Hrs  T(C): 36.6 (2022 05:13), Max: 37.1 (2022 08:51)  T(F): 97.8 (2022 05:13), Max: 98.8 (2022 08:51)  HR: 86 (2022 05:13) (86 - 100)  BP: 140/94 (2022 05:13) (130/92 - 145/87)  BP(mean): --  ABP: --  ABP(mean): --  RR: 18 (2022 05:13) (18 - 18)  SpO2: 95% (2022 05:13) (95% - 98%)      I&O's Summary      CAPILLARY BLOOD GLUCOSE      POCT Blood Glucose.: 248 mg/dL (2022 08:06)  POCT Blood Glucose.: 223 mg/dL (2022 00:45)  POCT Blood Glucose.: 258 mg/dL (2022 22:22)  POCT Blood Glucose.: 277 mg/dL (2022 18:44)      PHYSICAL EXAM:   Patient in NAD  Neck: No JVD; Carotids:  2+ without bruits  Respiratory:  Clear to A&P  Cardiovascular: S1 and S2, regular rate and rhythm, no Murmurs, gallops or rubs  Gastrointestinal:  Soft, non-tender; BS positive  Extremities: No peripheral edema  Vascular: 2+ peripheral pulses  Neurological: A/O x 3, no focal deficits      MEDICATIONS  (STANDING):  ALPRAZolam 0.25 milliGRAM(s) Oral at bedtime  apixaban 5 milliGRAM(s) Oral every 12 hours  aspirin enteric coated 81 milliGRAM(s) Oral daily  dextrose 5%. 1000 milliLiter(s) (100 mL/Hr) IV Continuous <Continuous>  dextrose 5%. 1000 milliLiter(s) (50 mL/Hr) IV Continuous <Continuous>  dextrose 50% Injectable 25 Gram(s) IV Push once  dextrose 50% Injectable 12.5 Gram(s) IV Push once  dextrose 50% Injectable 25 Gram(s) IV Push once  diltiazem    Tablet 30 milliGRAM(s) Oral every 6 hours  glucagon  Injectable 1 milliGRAM(s) IntraMuscular once  insulin glargine Injectable (LANTUS) 10 Unit(s) SubCutaneous at bedtime  insulin lispro (ADMELOG) corrective regimen sliding scale   SubCutaneous at bedtime  insulin lispro (ADMELOG) corrective regimen sliding scale   SubCutaneous three times a day before meals  lisinopril 5 milliGRAM(s) Oral daily  metoprolol succinate ER 50 milliGRAM(s) Oral daily        LABS: All Labs Reviewed:  Blood Culture:   BNP Serum Pro-Brain Natriuretic Peptide: 251 pg/mL ( @ 18:42)    CBC             WBC Count: 13.04 K/uL ( @ 18:42)              Hemoglobin: 17.2 g/dL ( @ 18:42)              Hematocrit: 49.7 % ( @ 18:42)              Mean Cell Volume: 80.4 fl ( @ 18:42)              Platelet Count - Automated: 232 K/uL ( @ 18:42)                        14.9   5.53  )-----------( 169      ( 2022 06:17 )             45.3                               Cardiac markers   Troponin I, High Sensitivity (22 @ 18:42) Troponin I, High Sensitivity Result: 9.13  Troponin I, High Sensitivity (22 @ 07:21) Troponin I, High Sensitivity Result: 8.54                                     Chems        Sodium, Serum: 131 mmol/L ( @ 07:21)  Sodium, Serum: 131 mmol/L ( @ 18:42)          Potassium, Serum: 4.2 mmol/L ( @ 07:21)  Potassium, Serum: 3.3 mmol/L ( @ 18:42)          Blood Urea Nitrogen, Serum: 16 mg/dL ( @ :21)  Blood Urea Nitrogen, Serum: 18 mg/dL ( @ 18:42)          Creatinine 0.93  Creatinine 1.17          Magnesium, Serum: 2.1 mg/dL ( @ 07:21)          Protein Total, Serum: 7.6 gm/dL ( @ 07:21)  Protein Total, Serum: 8.3 gm/dL ( @ 18:42)                  Calcium, Total Serum: 9.1 mg/dL ( @ 07:21)  Calcium, Total Serum: 9.1 mg/dL ( @ 18:42)                  Bilirubin Total, Serum: 1.4 mg/dL ( @ 07:21)  Bilirubin Total, Serum: 1.0 mg/dL ( @ 18:42)          Alanine Aminotransferase (ALT/SGPT): 70 U/L ( @ 07:21)  Alanine Aminotransferase (ALT/SGPT): 84 U/L ( @ 18:42)          Aspartate Aminotransferase (AST/SGOT): 34 U/L ( @ 07:21)  Aspartate Aminotransferase (AST/SGOT): 40 U/L ( @ 18:42)                 INR: 1.09 ratio ( 18:42)             RADIOLOGY:    EKG:    Telemetry:  Sinus rhythm    ECHO:  < from: TTE Echo Complete w/o Contrast w/ Doppler (22 @ 11:30) >   Impression     Summary     The mitral valve was well visualized.   The mitral valve leaflets appear thin and normal.   Trace mitral regurgitation is present.   EA reversal of the mitral inflow consistent with reduced compliance of   the   left ventricle.   The aortic valve is well visualized, appears mildly calcified. Valve   opening seems to be normal.   The tricuspid valve leaflets are thin and pliable; valve motion is   normal.   Trace tricuspid valve regurgitation is present.   Normal appearing left atrium.   Technically difficult exam based on limited views the left ventricular   systolic function appears preserved in the presence of a cardiac   arrhythmia. Left ventricle size and structure are withinnormal   limitations. Visual estimation of left ventricle ejection fraction is   60-65%.   Normal appearing right atrium.   Normal appearing right ventricle structure and function.     Signature     ----------------------------------------------------------------   Electronically signed by Thais Lockwood MD(Interpreting    < end of copied text >

## 2022-06-05 NOTE — PROGRESS NOTE ADULT - SUBJECTIVE AND OBJECTIVE BOX
HOSPITALIST PROGRESS NOTE:  SUBJECTIVE:  PCP:  Chief Complaint: Patient is a 56y old  Male who presents with a chief complaint of new AFIB w RVR (04 Jun 2022 09:10)      HPI:  56 year old male w hx DM, HTN on lisinopril came to ED c/o SOB    Pt states recently returned to [place] from [place] 2 days ago afer selling home  Had not felt well there  +SOB especially when bending over  +diaphoretic + nausea  denied chest pain   reported that pt had been diaphoretic x 3 days intermittently    Significant stresses recently    Had been on metformin but was taken off 1 year ago  Last stress test 1 yr ago normal      In ED /97     RR 20    T 98.7    98%   AFIB w RVR  190  diltiazem 20 mg x 2  diltiazem 60 mg po  converted from afib to sinus tach   telemetry now AF  KCl 40 meq po    6/4:  Above reviewed. Patient has no complaints. No CP or palpitations; sugars have been uncontrolled; converted to NSR;   6/5: Patient stayed in NSR; NO CP or dyspnea; No other events     Allergies:  melatonin (Other)  No Known Allergies    REVIEW OF SYSTEMS:  See HPI. All other review of systems is negative unless indicated above.     OBJECTIVE  Physical Exam:  Vital Signs Last 24 Hrs  T(C): 36.3 (05 Jun 2022 08:24), Max: 36.8 (04 Jun 2022 15:44)  T(F): 97.3 (05 Jun 2022 08:24), Max: 98.2 (04 Jun 2022 15:44)  HR: 87 (05 Jun 2022 08:24) (86 - 98)  BP: 138/92 (05 Jun 2022 08:24) (130/92 - 145/87)  BP(mean): --  RR: 18 (05 Jun 2022 08:24) (18 - 18)  SpO2: 96% (05 Jun 2022 08:24) (95% - 98%)    Constitutional: NAD, awake and alert  Neurological: AAO x 3, no focal deficits  HEENT: PERRLA, EOMI, MMM  Neck: Soft and supple, No LAD, No JVD  Respiratory: Breath sounds are clear bilaterally, No wheezing, rales or rhonchi  Cardiovascular: S1 and S2, regular rate and rhythm; no Murmurs, gallops or rubs  Gastrointestinal: Bowel Sounds present, soft, nontender, nondistended, no guarding, no rebound tenderness  Back: No CVA tenderness   Extremities: No peripheral edema  Vascular: 2+ peripheral pulses  Musculoskeletal: 5/5 strength b/l upper and lower extremities  Skin: No rashes  Breast: Deferred  Rectal: Deferred    MEDICATIONS  (STANDING):  ALPRAZolam 0.25 milliGRAM(s) Oral at bedtime  apixaban 5 milliGRAM(s) Oral every 12 hours  aspirin enteric coated 81 milliGRAM(s) Oral daily  dextrose 5%. 1000 milliLiter(s) (100 mL/Hr) IV Continuous <Continuous>  dextrose 5%. 1000 milliLiter(s) (50 mL/Hr) IV Continuous <Continuous>  dextrose 50% Injectable 25 Gram(s) IV Push once  dextrose 50% Injectable 12.5 Gram(s) IV Push once  dextrose 50% Injectable 25 Gram(s) IV Push once  diltiazem    Tablet 30 milliGRAM(s) Oral every 6 hours  glucagon  Injectable 1 milliGRAM(s) IntraMuscular once  insulin lispro (ADMELOG) corrective regimen sliding scale   SubCutaneous three times a day before meals  insulin lispro (ADMELOG) corrective regimen sliding scale   SubCutaneous at bedtime  lisinopril 5 milliGRAM(s) Oral daily  metoprolol succinate ER 50 milliGRAM(s) Oral daily    Lab Results:  CBC  CBC Full  -  ( 05 Jun 2022 06:17 )  WBC Count : 5.53 K/uL  RBC Count : 5.44 M/uL  Hemoglobin : 14.9 g/dL  Hematocrit : 45.3 %  Platelet Count - Automated : 169 K/uL  Mean Cell Volume : 83.3 fl  Mean Cell Hemoglobin : 27.4 pg  Mean Cell Hemoglobin Concentration : 32.9 gm/dL  Auto Neutrophil # : x  Auto Lymphocyte # : x  Auto Monocyte # : x  Auto Eosinophil # : x  Auto Basophil # : x  Auto Neutrophil % : x  Auto Lymphocyte % : x  Auto Monocyte % : x  Auto Eosinophil % : x  Auto Basophil % : x    .		Differential:	[] Automated		[] Manual  Chemistry                        14.9   5.53  )-----------( 169      ( 05 Jun 2022 06:17 )             45.3     06-05    135  |  101  |  16  ----------------------------<  215<H>  3.7   |  26  |  0.88    Ca    8.6      05 Jun 2022 06:17  Phos  2.9     06-05  Mg     2.2     06-05    TPro  7.6  /  Alb  3.9  /  TBili  1.4<H>  /  DBili  0.4<H>  /  AST  34  /  ALT  70  /  AlkPhos  59  06-04    LIVER FUNCTIONS - ( 04 Jun 2022 07:21 )  Alb: 3.9 g/dL / Pro: 7.6 gm/dL / ALK PHOS: 59 U/L / ALT: 70 U/L / AST: 34 U/L / GGT: x           PT/INR - ( 03 Jun 2022 18:42 )   PT: 12.6 sec;   INR: 1.09 ratio         PTT - ( 03 Jun 2022 18:42 )  PTT:30.0 sec      RADIOLOGY/EKG:    < from: Xray Chest 1 View- PORTABLE-Urgent (06.03.22 @ 19:13) >    IMPRESSION:  No active parenchymal disease in the chest.      < end of copied text >    < from: TTE Echo Complete w/o Contrast w/ Doppler (06.04.22 @ 11:30) >     Impression     Summary     The mitral valve was well visualized.   The mitral valve leaflets appear thin and normal.   Trace mitral regurgitation is present.   EA reversal of the mitral inflow consistent with reduced compliance of   the   left ventricle.   The aortic valve is well visualized, appears mildly calcified. Valve   opening seems to be normal.   The tricuspid valve leaflets are thin and pliable; valve motion is   normal.   Trace tricuspid valve regurgitation is present.   Normal appearing left atrium.   Technically difficult exam based on limited views the left ventricular   systolic function appears preserved in the presence of a cardiac   arrhythmia. Left ventricle size and structure are withinnormal   limitations. Visual estimation of left ventricle ejection fraction is   60-65%.   Normal appearing right atrium.   Normal appearing right ventricle structure and function.     Signature    < end of copied text >

## 2022-06-05 NOTE — DISCHARGE NOTE NURSING/CASE MANAGEMENT/SOCIAL WORK - NSPROEXTENSIONSOFSELF_GEN_A_NUR
- continue Ozempic 0.5 mg weekly  - cut down fancy coffee and sugary snack  - prepare lunch and snack to work    - return in 2-3 months    If you have any questions, please do not hesitate to call Weight management clinic at [phone] or [phone].  If you need to fax, please fax to [phone].    Sincerely,    Stephen Null MD  Endocrinology     none

## 2022-06-05 NOTE — DISCHARGE NOTE NURSING/CASE MANAGEMENT/SOCIAL WORK - NSDCPEFALRISK_GEN_ALL_CORE
For information on Fall & Injury Prevention, visit: [email or web address] OR  [email or web address] OR  https://www.cdc.gov/steadi/patient.html

## 2022-06-05 NOTE — DISCHARGE NOTE PROVIDER - NSDCMRMEDTOKEN_GEN_ALL_CORE_FT
aspirin 81 mg oral delayed release tablet: 1 tab(s) orally once a day  dilTIAZem 120 mg/24 hours oral capsule, extended release: 1 cap(s) orally once a day  Lipitor 10 mg oral tablet: 1 tab(s) orally once a day   metFORMIN 1000 mg oral tablet: 1 tab(s) orally 2 times a day   metoprolol succinate 50 mg oral tablet, extended release: 1 tab(s) orally once a day

## 2022-06-05 NOTE — PROGRESS NOTE ADULT - ASSESSMENT
#New AFIB w RVR  CHA2DS 2- VASC  2 / CHADS 2 - S/P Eliquis continue ASA   -continue  telemetry  -continue  Cardizem PO and Metoprolol  -ECHO isual estimation of left ventricle ejection fraction is 60-65%. EA reversal of the mitral inflow consistent with reduced compliance of the  left ventricle.  -Cardiology consult appreciated   -TSH WNL    #DM II w hyperglycemia - uncontrolled   -diet  -BGM  -ISS  -Hb A1c 10.3  -Lantus 10 for now   -restart metfoormin 1000 BID     #Apnea at night observed by    -O2 overnight  - to discuss sleep study w PCP    #Hyperlipidemia  -started on statin     #HTN  - Stop  lisinopril   - continue Diltiazem and Metoprolol    #VTE  Eliquis    Dispo discussed with Dr Mcelroy who is covering for Dr Benitze

## 2022-06-05 NOTE — PROGRESS NOTE ADULT - ASSESSMENT
56 year old man with the above history who is admitted with new onset rapid atrial fibrillation.   I will start oral AC with Eliquis. I will also start b blocker therapy.  He may require NICOLE/cardioversion.  No evidence for ACS.    6/4/22:  Patient stable, now in sinus rhythm.  Will change diltiazem to  and he can be discharged on that with metoprolol ER 50.  He can follow up with Dr Barraza as an outpatient. 56 year old man with the above history who is admitted with new onset rapid atrial fibrillation.   I will start oral AC with Eliquis. I will also start b blocker therapy.  He may require NICOLE/cardioversion.  No evidence for ACS.    6/5/22:  Patient stable, now in sinus rhythm.  Will change diltiazem to  and he can be discharged on that with metoprolol ER 50.  He can follow up with Dr Barraza as an outpatient.

## 2022-06-05 NOTE — DISCHARGE NOTE PROVIDER - NSDCCPCAREPLAN_GEN_ALL_CORE_FT
PRINCIPAL DISCHARGE DIAGNOSIS  Diagnosis: Rapid atrial fibrillation  Assessment and Plan of Treatment: #New AFIB w RVR  CHA2DS 2- VASC  2 / CHADS 2 - S/P Eliquis continue ASA   -continue  Cardizem PO and Metoprolol  -ECHO isual estimation of left ventricle ejection fraction is 60-65%. EA reversal of the mitral inflow consistent with reduced compliance of the  left ventricle.  -TSH WNL  -FU with cardio Dr Barraza next week        SECONDARY DISCHARGE DIAGNOSES  Diagnosis: Diabetes  Assessment and Plan of Treatment: #DM II w hyperglycemia - uncontrolled   -diet  -Hb A1c 10.3  -restart metfoormin 1000 BID   -man need long acting insullin  -FU with PCP      Diagnosis: Hyperlipidemia  Assessment and Plan of Treatment: #Hyperlipidemia  -started on lipitor      Diagnosis: Hypertension  Assessment and Plan of Treatment: - Stop  lisinopril   - continue Diltiazem and Metoprolol

## 2022-06-05 NOTE — DISCHARGE NOTE PROVIDER - CARE PROVIDER_API CALL
Veto Barraza (MD)  Cardiovascular Disease; Internal Medicine; Nuclear Cardiology  [address] [place] [address]  Phone: [phone]  Fax: [phone]  Follow Up Time:

## 2022-06-05 NOTE — DISCHARGE NOTE PROVIDER - HOSPITAL COURSE
HOSPITALIST PROGRESS NOTE:  SUBJECTIVE:  PCP:  Chief Complaint: Patient is a 56y old  Male who presents with a chief complaint of new AFIB w RVR (04 Jun 2022 09:10)      HPI:  56 year old male w hx DM, HTN on lisinopril came to ED c/o SOB    Pt states recently returned to [place] from [place] 2 days ago afer selling home  Had not felt well there  +SOB especially when bending over  +diaphoretic + nausea  denied chest pain   reported that pt had been diaphoretic x 3 days intermittently    Significant stresses recently    Had been on metformin but was taken off 1 year ago  Last stress test 1 yr ago normal      In ED /97     RR 20    T 98.7    98%   AFIB w RVR  190  diltiazem 20 mg x 2  diltiazem 60 mg po  converted from afib to sinus tach   telemetry now AF  KCl 40 meq po    6/4:  Above reviewed. Patient has no complaints. No CP or palpitations; sugars have been uncontrolled; converted to NSR;   6/5: Patient stayed in NSR; NO CP or dyspnea; No other events         #New AFIB w RVR  CHA2DS 2- VASC  2 / CHADS 2 - S/P Eliquis continue ASA   -continue  telemetry  -continue  Cardizem PO and Metoprolol  -ECHO isual estimation of left ventricle ejection fraction is 60-65%. EA reversal of the mitral inflow consistent with reduced compliance of the  left ventricle.  -Cardiology consult appreciated   -TSH WNL    #DM II w hyperglycemia - uncontrolled   -diet  -BGM  -ISS  -Hb A1c 10.3  -Lantus 10 for now   -restart metfoormin 1000 BID     #Apnea at night observed by    -O2 overnight  - to discuss sleep study w PCP    #Hyperlipidemia  -started on statin     #HTN  - Stop  lisinopril   - continue Diltiazem and Metoprolol    #VTE  Eliquis    Dispo discussed with Dr Mcelroy who is covering for Dr Benitez

## 2022-06-05 NOTE — DISCHARGE NOTE NURSING/CASE MANAGEMENT/SOCIAL WORK - PATIENT PORTAL LINK FT
You can access the FollowMyHealth Patient Portal offered by [facility] by registering at the following website: [email or web address]. By joining [employer] [facility]’s FollowMyHealth portal, you will also be able to view your health information using other applications (apps) compatible with our system.

## 2022-06-08 NOTE — ED ADULT NURSE NOTE - NSIMPLEMENTINTERV_GEN_ALL_ED
Requested Prescriptions     Refused Prescriptions Disp Refills    rosuvastatin (CRESTOR) 10 mg tablet [Pharmacy Med Name: ROSUVASTATIN TABS 10MG] 90 Tablet 3     Sig: TAKE 1 TABLET NIGHTLY     Refused By: Noemy Reinoso     Reason for Refusal: Request already responded to by other means (e.g. phone or fax)     Refill already sent to [facility] on 6/6/22.
Implemented All Universal Safety Interventions:  [place] to call system. Call bell, personal items and telephone within reach. Instruct patient to call for assistance. Room bathroom lighting operational. Non-slip footwear when patient is off stretcher. Physically safe environment: no spills, clutter or unnecessary equipment. Stretcher in lowest position, wheels locked, appropriate side rails in place.

## 2022-06-13 DIAGNOSIS — I10 ESSENTIAL (PRIMARY) HYPERTENSION: ICD-10-CM

## 2022-06-13 DIAGNOSIS — E11.65 TYPE 2 DIABETES MELLITUS WITH HYPERGLYCEMIA: ICD-10-CM

## 2022-06-13 DIAGNOSIS — E78.5 HYPERLIPIDEMIA, UNSPECIFIED: ICD-10-CM

## 2022-06-13 DIAGNOSIS — E87.6 HYPOKALEMIA: ICD-10-CM

## 2022-06-13 DIAGNOSIS — R06.81 APNEA, NOT ELSEWHERE CLASSIFIED: ICD-10-CM

## 2022-06-13 DIAGNOSIS — I48.91 UNSPECIFIED ATRIAL FIBRILLATION: ICD-10-CM

## 2023-10-16 NOTE — H&P ADULT - BIRTH SEX
Pt has not had a PFT since 2013. Pt has had referrals to complete the last two admissions from note history. Pt is on Spiriva and Symbicort as outpatient. Pt would automatically be considered a Group E based on hospital stays for COPD. Pt Eosinophils are in a standard range, but patient takes ICS in and out patient. Male

## 2025-04-17 NOTE — DISCHARGE NOTE PROVIDER - NSTOBACCOUSAGEY/N_GEN_A_CS
Med rec completed per medication dispense history per pharmacy on file for patient, [facility] on [address] ([phone]). Patient is unable to participate in interview. Unable to confirm times of last doses or recent over-the-counter medications.    Unable to assess patient's allergies.    Outpatient antibiotics dispensed within the last 30 days: NONE.    ANTICOAGULANTS: NONE.    *No methadone or antibiotics within the last 90 days per [facility]. Unable to verify methadone or antibiotics reported by EMS.   No

## 2025-07-10 NOTE — PATIENT PROFILE ADULT - FUNCTIONAL ASSESSMENT - BASIC MOBILITY ASSESSMENT TYPE
Treatment Goal Met?: no
Treatment Goal Explanation (Does Not Render In The Note): Stable for the purposes of categorizing medical decision making is defined by the specific treatment goals for an individual patient. A patient that is not at their treatment goal is not stable, even if the condition has not changed and there is no short- term threat to life or function.
Admission